# Patient Record
Sex: MALE | Race: WHITE
[De-identification: names, ages, dates, MRNs, and addresses within clinical notes are randomized per-mention and may not be internally consistent; named-entity substitution may affect disease eponyms.]

---

## 2019-06-18 ENCOUNTER — HOSPITAL ENCOUNTER (INPATIENT)
Dept: HOSPITAL 54 - ER | Age: 24
LOS: 3 days | Discharge: HOME | DRG: 640 | End: 2019-06-21
Attending: INTERNAL MEDICINE | Admitting: INTERNAL MEDICINE
Payer: COMMERCIAL

## 2019-06-18 VITALS — SYSTOLIC BLOOD PRESSURE: 134 MMHG | DIASTOLIC BLOOD PRESSURE: 74 MMHG

## 2019-06-18 VITALS — DIASTOLIC BLOOD PRESSURE: 69 MMHG | SYSTOLIC BLOOD PRESSURE: 104 MMHG

## 2019-06-18 VITALS — DIASTOLIC BLOOD PRESSURE: 74 MMHG | SYSTOLIC BLOOD PRESSURE: 134 MMHG

## 2019-06-18 VITALS — DIASTOLIC BLOOD PRESSURE: 63 MMHG | SYSTOLIC BLOOD PRESSURE: 110 MMHG

## 2019-06-18 VITALS — HEIGHT: 71 IN | BODY MASS INDEX: 27.3 KG/M2 | WEIGHT: 195.01 LBS

## 2019-06-18 DIAGNOSIS — E86.0: Primary | ICD-10-CM

## 2019-06-18 DIAGNOSIS — K44.9: ICD-10-CM

## 2019-06-18 DIAGNOSIS — K22.6: ICD-10-CM

## 2019-06-18 DIAGNOSIS — D72.829: ICD-10-CM

## 2019-06-18 DIAGNOSIS — A08.4: ICD-10-CM

## 2019-06-18 DIAGNOSIS — K29.71: ICD-10-CM

## 2019-06-18 DIAGNOSIS — R79.89: ICD-10-CM

## 2019-06-18 DIAGNOSIS — K29.81: ICD-10-CM

## 2019-06-18 DIAGNOSIS — E80.6: ICD-10-CM

## 2019-06-18 DIAGNOSIS — Z88.0: ICD-10-CM

## 2019-06-18 LAB
ALBUMIN SERPL BCP-MCNC: 4 G/DL (ref 3.4–5)
ALP SERPL-CCNC: 84 U/L (ref 46–116)
ALT SERPL W P-5'-P-CCNC: 38 U/L (ref 12–78)
AST SERPL W P-5'-P-CCNC: 19 U/L (ref 15–37)
BASOPHILS # BLD AUTO: 0.1 /CMM (ref 0–0.2)
BASOPHILS # BLD AUTO: 0.1 /CMM (ref 0–0.2)
BASOPHILS NFR BLD AUTO: 0.5 % (ref 0–2)
BASOPHILS NFR BLD AUTO: 0.6 % (ref 0–2)
BILIRUB DIRECT SERPL-MCNC: 0.2 MG/DL (ref 0–0.2)
BILIRUB SERPL-MCNC: 1.4 MG/DL (ref 0.2–1)
BUN SERPL-MCNC: 28 MG/DL (ref 7–18)
CALCIUM SERPL-MCNC: 8.6 MG/DL (ref 8.5–10.1)
CHLORIDE SERPL-SCNC: 104 MMOL/L (ref 98–107)
CO2 SERPL-SCNC: 28 MMOL/L (ref 21–32)
CREAT SERPL-MCNC: 0.9 MG/DL (ref 0.6–1.3)
EOSINOPHIL NFR BLD AUTO: 1.6 % (ref 0–6)
EOSINOPHIL NFR BLD AUTO: 4.1 % (ref 0–6)
GLUCOSE SERPL-MCNC: 130 MG/DL (ref 74–106)
HCT VFR BLD AUTO: 43 % (ref 39–51)
HCT VFR BLD AUTO: 47 % (ref 39–51)
HGB BLD-MCNC: 14.6 G/DL (ref 13.5–17.5)
HGB BLD-MCNC: 15.8 G/DL (ref 13.5–17.5)
LIPASE SERPL-CCNC: 119 U/L (ref 73–393)
LYMPHOCYTES NFR BLD AUTO: 1.7 /CMM (ref 0.8–4.8)
LYMPHOCYTES NFR BLD AUTO: 14.9 % (ref 20–44)
LYMPHOCYTES NFR BLD AUTO: 19.3 % (ref 20–44)
LYMPHOCYTES NFR BLD AUTO: 2.6 /CMM (ref 0.8–4.8)
MCHC RBC AUTO-ENTMCNC: 34 G/DL (ref 31–36)
MCHC RBC AUTO-ENTMCNC: 34 G/DL (ref 31–36)
MCV RBC AUTO: 88 FL (ref 80–96)
MCV RBC AUTO: 89 FL (ref 80–96)
MONOCYTES NFR BLD AUTO: 0.7 /CMM (ref 0.1–1.3)
MONOCYTES NFR BLD AUTO: 1 /CMM (ref 0.1–1.3)
MONOCYTES NFR BLD AUTO: 5.7 % (ref 2–12)
MONOCYTES NFR BLD AUTO: 7.6 % (ref 2–12)
NEUTROPHILS # BLD AUTO: 9 /CMM (ref 1.8–8.9)
NEUTROPHILS # BLD AUTO: 9.3 /CMM (ref 1.8–8.9)
NEUTROPHILS NFR BLD AUTO: 68.4 % (ref 43–81)
NEUTROPHILS NFR BLD AUTO: 77.3 % (ref 43–81)
PLATELET # BLD AUTO: 252 /CMM (ref 150–450)
PLATELET # BLD AUTO: 287 /CMM (ref 150–450)
POTASSIUM SERPL-SCNC: 4.2 MMOL/L (ref 3.5–5.1)
PROT SERPL-MCNC: 7.6 G/DL (ref 6.4–8.2)
RBC # BLD AUTO: 4.85 MIL/UL (ref 4.5–6)
RBC # BLD AUTO: 5.25 MIL/UL (ref 4.5–6)
SODIUM SERPL-SCNC: 140 MMOL/L (ref 136–145)
WBC NRBC COR # BLD AUTO: 11.7 K/UL (ref 4.3–11)
WBC NRBC COR # BLD AUTO: 13.6 K/UL (ref 4.3–11)

## 2019-06-18 PROCEDURE — C9113 INJ PANTOPRAZOLE SODIUM, VIA: HCPCS

## 2019-06-18 PROCEDURE — G0378 HOSPITAL OBSERVATION PER HR: HCPCS

## 2019-06-18 RX ADMIN — DEXTROSE AND SODIUM CHLORIDE PRN MLS/HR: 5; 450 INJECTION, SOLUTION INTRAVENOUS at 12:54

## 2019-06-18 RX ADMIN — DEXTROSE AND SODIUM CHLORIDE PRN MLS/HR: 5; 450 INJECTION, SOLUTION INTRAVENOUS at 21:53

## 2019-06-18 RX ADMIN — SUCRALFATE SCH G: 1 SUSPENSION ORAL at 17:49

## 2019-06-18 RX ADMIN — ESOMEPRAZOLE SODIUM SCH MG: 40 INJECTION, POWDER, LYOPHILIZED, FOR SOLUTION INTRAVENOUS at 13:47

## 2019-06-18 RX ADMIN — ESOMEPRAZOLE SODIUM SCH MG: 40 INJECTION, POWDER, LYOPHILIZED, FOR SOLUTION INTRAVENOUS at 16:00

## 2019-06-18 RX ADMIN — SUCRALFATE SCH G: 1 SUSPENSION ORAL at 23:04

## 2019-06-18 NOTE — NUR
MS/RN OPENING NOTES

RECEIVED PATIENT IN BES, AWAKE, ALERT X4, NO GRIMACE OR GUARDING, SKIN WARM TO TOUCH, ABLE 
TO VERBALIZE NEEDS, DISCUSSED PLAN OF CARE AND PROCEDURE FOR EGD, CONSENT SIGNED,. SKIN WARM 
TO TOUCH, BED LOCKED, CALL LIGHTS WITHIN REACH. WILL MONITOR. BED LOCKED, CALL LIGHTS WITHIN 
REACH. WILL MONITOR.OFFERED FLUIDS.

## 2019-06-18 NOTE — NUR
ADMISSION NOTE



PT WAS BROUGHT UP VIA GURNEY AT THIS TIME, A/O X4 BREATHING EVEN AND UNLABORED ON RA, NO 
COMPLAINTS OF PAIN OR ANY DISTRESS NOTED, NOTED TO HAVE RIGHT AC GAUGE 18 IV, BELONGINGS 
PRESENT AT BEDSIDE, AMBULATORY, SKIN DRY AND INTACT, CURRENTLY NPO DUE TO VOMITING UP BLOOD. 
SAFETY PRECAUTIONS IN PLACE, CALL LIGHT WITHIN REACH, WILL MONITOR ACCORDINGLY

## 2019-06-18 NOTE — NUR
RN CLOSING NOTE



PT IN BED AT LOWEST AND LOCKED POSITION WITH SIDE RAILS UP X2, A/O X4 BREATHING EVEN AND 
UNLABORED WITH NO PAIN OR DISTRESS, IV IS PATENT AND INTACT, SAFETY PRECAUTIONS IN PLACE, 
CALL LIGHT WITHIN REACH, ALL NEEDS ATTENDED TO, WILL ENDORSE TO NIGHT SHIFT RN FOR DAMI.

## 2019-06-18 NOTE — NUR
VOMITING BLOOD STARTED LAST NIGHT PER PT. PT AAOX4, RR EVEN & UNLABORED. SKIN 
PALE. ALSO C/O ABD PAIN 4/10 & GE WELL. DENIES CP, SOB, DIZZINESS, DIARRHEA AT 
THIS TIME. PT SEEN & EVAL'D BY DR. DIAS. MEDICATED AS ORDERED & WILL CONT 
TO MONITOR.

## 2019-06-19 VITALS — DIASTOLIC BLOOD PRESSURE: 61 MMHG | SYSTOLIC BLOOD PRESSURE: 110 MMHG

## 2019-06-19 VITALS — SYSTOLIC BLOOD PRESSURE: 129 MMHG | DIASTOLIC BLOOD PRESSURE: 57 MMHG

## 2019-06-19 VITALS — DIASTOLIC BLOOD PRESSURE: 75 MMHG | SYSTOLIC BLOOD PRESSURE: 143 MMHG

## 2019-06-19 VITALS — SYSTOLIC BLOOD PRESSURE: 119 MMHG | DIASTOLIC BLOOD PRESSURE: 62 MMHG

## 2019-06-19 LAB
BASOPHILS # BLD AUTO: 0.1 /CMM (ref 0–0.2)
BASOPHILS NFR BLD AUTO: 0.6 % (ref 0–2)
BUN SERPL-MCNC: 19 MG/DL (ref 7–18)
CALCIUM SERPL-MCNC: 7.7 MG/DL (ref 8.5–10.1)
CHLORIDE SERPL-SCNC: 105 MMOL/L (ref 98–107)
CO2 SERPL-SCNC: 26 MMOL/L (ref 21–32)
CREAT SERPL-MCNC: 0.8 MG/DL (ref 0.6–1.3)
EOSINOPHIL NFR BLD AUTO: 4 % (ref 0–6)
GLUCOSE SERPL-MCNC: 122 MG/DL (ref 74–106)
HCT VFR BLD AUTO: 35 % (ref 39–51)
HEMOCCULT STL QL: POSITIVE
HGB BLD-MCNC: 11.6 G/DL (ref 13.5–17.5)
LYMPHOCYTES NFR BLD AUTO: 35.6 % (ref 20–44)
LYMPHOCYTES NFR BLD AUTO: 4 /CMM (ref 0.8–4.8)
MAGNESIUM SERPL-MCNC: 1.9 MG/DL (ref 1.8–2.4)
MCHC RBC AUTO-ENTMCNC: 34 G/DL (ref 31–36)
MCV RBC AUTO: 88 FL (ref 80–96)
MONOCYTES NFR BLD AUTO: 0.8 /CMM (ref 0.1–1.3)
MONOCYTES NFR BLD AUTO: 7.6 % (ref 2–12)
NEUTROPHILS # BLD AUTO: 5.8 /CMM (ref 1.8–8.9)
NEUTROPHILS NFR BLD AUTO: 52.2 % (ref 43–81)
PHOSPHATE SERPL-MCNC: 3.2 MG/DL (ref 2.5–4.9)
PLATELET # BLD AUTO: 242 /CMM (ref 150–450)
POTASSIUM SERPL-SCNC: 3.5 MMOL/L (ref 3.5–5.1)
RBC # BLD AUTO: 3.93 MIL/UL (ref 4.5–6)
SODIUM SERPL-SCNC: 139 MMOL/L (ref 136–145)
WBC NRBC COR # BLD AUTO: 11.1 K/UL (ref 4.3–11)

## 2019-06-19 RX ADMIN — SUCRALFATE SCH G: 1 SUSPENSION ORAL at 07:30

## 2019-06-19 RX ADMIN — SUCRALFATE SCH G: 1 SUSPENSION ORAL at 21:25

## 2019-06-19 RX ADMIN — ESOMEPRAZOLE SODIUM SCH MG: 40 INJECTION, POWDER, LYOPHILIZED, FOR SOLUTION INTRAVENOUS at 09:17

## 2019-06-19 RX ADMIN — ESOMEPRAZOLE SODIUM SCH MG: 40 INJECTION, POWDER, LYOPHILIZED, FOR SOLUTION INTRAVENOUS at 16:50

## 2019-06-19 RX ADMIN — DEXTROSE AND SODIUM CHLORIDE PRN MLS/HR: 5; 450 INJECTION, SOLUTION INTRAVENOUS at 12:14

## 2019-06-19 RX ADMIN — SUCRALFATE SCH G: 1 SUSPENSION ORAL at 12:13

## 2019-06-19 RX ADMIN — SUCRALFATE SCH G: 1 SUSPENSION ORAL at 16:50

## 2019-06-19 NOTE — NUR
RN MS NOTES

PT IN BED, ASLEEP, EASY TO AROUSE, ALERT AND ORIENTED, PER MARCOS ROBERTSON, EGD WILL NOT BE DONE 
TODAY, ORDERED TO RESUME PREVIOUS DIET, PT TOLERATES CURRENT DIET, NO NAUSEA OR VOMITING, IV 
FLUIDS INFUSING WELL, CALL LIGHT WITHIN REACH.

## 2019-06-19 NOTE — NUR
CHANGE OF SHIFT REPORT

Patient is awake sitting up in bed. Stable oxygen saturation on RA. IVF infusing. NPO 
breakfast per report, scheduled for EGD in am, consent was signed. Patient denies pain. 
Maintained safety, will cont to monitor.

## 2019-06-19 NOTE — NUR
RN MS NOTES

PT IN BED, AWAKE, ALERT AND ORIENTED, NO COMPLAINT OF PAIN OR ANY DISCOMFORT, NO COMPLAINT 
OF NAUSEA/VOMITING, NOT IN DISTRESS, IV FLUIDS INFUSING WELL, CALL LIGHT WITHIN REACH, 
COMPLIANT AND COOPERATIVE WITH CARE, NEEDS ATTENDED.

## 2019-06-19 NOTE — NUR
MS/RN CLOSING NOTES

PATIENT ABLE TO SLEEP DURING THE NIGHT, ALERT, ORIENTED. DENIES PAIN, NO GUARDING OR GRIMACE 
OBSERVED, RESPIRATIONS EVEN AND UNLABORED, DISCUSSED PLAN OF CARE AND ON NOTHING PER MOUTH 
SINCE 12 MN. FOR PROCEDURE TODAY. KEPT COMFORTABLE. ABLE TO AMULATE TO BATHROOM, COLLECTED 
OCCULT SKINNY. BED LOCKED, CALL LIGHTS WITHIN REACH, DENIA ENDORSE TO AM RN FOR DAMI.

## 2019-06-19 NOTE — NUR
RN MS NOTES

PT IN BED, AWAKE, ALERT AND ORIENTED, DENIES PAIN, NOT IN DISTRESS, VISITOR AT BEDSIDE, IV 
FLUIDS INFUSING WELL, NO BLACK STOOL SINCE THIS MORNING, NO COMPLAINT OF NAUSEA OR VOMITING, 
CALL LIGHT PLACED WITHIN REACH, ALL NEEDS ATTENDED.

## 2019-06-20 VITALS — DIASTOLIC BLOOD PRESSURE: 66 MMHG | SYSTOLIC BLOOD PRESSURE: 118 MMHG

## 2019-06-20 VITALS — DIASTOLIC BLOOD PRESSURE: 65 MMHG | SYSTOLIC BLOOD PRESSURE: 140 MMHG

## 2019-06-20 VITALS — DIASTOLIC BLOOD PRESSURE: 53 MMHG | SYSTOLIC BLOOD PRESSURE: 118 MMHG

## 2019-06-20 LAB
BASOPHILS # BLD AUTO: 0.1 /CMM (ref 0–0.2)
BASOPHILS NFR BLD AUTO: 0.9 % (ref 0–2)
BUN SERPL-MCNC: 8 MG/DL (ref 7–18)
CALCIUM SERPL-MCNC: 8.2 MG/DL (ref 8.5–10.1)
CHLORIDE SERPL-SCNC: 105 MMOL/L (ref 98–107)
CO2 SERPL-SCNC: 28 MMOL/L (ref 21–32)
CREAT SERPL-MCNC: 0.9 MG/DL (ref 0.6–1.3)
EOSINOPHIL NFR BLD AUTO: 5.1 % (ref 0–6)
GLUCOSE SERPL-MCNC: 110 MG/DL (ref 74–106)
HCT VFR BLD AUTO: 30 % (ref 39–51)
HGB BLD-MCNC: 10.2 G/DL (ref 13.5–17.5)
LYMPHOCYTES NFR BLD AUTO: 3 /CMM (ref 0.8–4.8)
LYMPHOCYTES NFR BLD AUTO: 45.4 % (ref 20–44)
MAGNESIUM SERPL-MCNC: 1.8 MG/DL (ref 1.8–2.4)
MCHC RBC AUTO-ENTMCNC: 34 G/DL (ref 31–36)
MCV RBC AUTO: 88 FL (ref 80–96)
MONOCYTES NFR BLD AUTO: 0.4 /CMM (ref 0.1–1.3)
MONOCYTES NFR BLD AUTO: 5.7 % (ref 2–12)
NEUTROPHILS # BLD AUTO: 2.9 /CMM (ref 1.8–8.9)
NEUTROPHILS NFR BLD AUTO: 42.9 % (ref 43–81)
PHOSPHATE SERPL-MCNC: 2.7 MG/DL (ref 2.5–4.9)
PLATELET # BLD AUTO: 200 /CMM (ref 150–450)
POTASSIUM SERPL-SCNC: 3.6 MMOL/L (ref 3.5–5.1)
RBC # BLD AUTO: 3.39 MIL/UL (ref 4.5–6)
SODIUM SERPL-SCNC: 140 MMOL/L (ref 136–145)
WBC NRBC COR # BLD AUTO: 6.7 K/UL (ref 4.3–11)

## 2019-06-20 PROCEDURE — 0DB48ZX EXCISION OF ESOPHAGOGASTRIC JUNCTION, VIA NATURAL OR ARTIFICIAL OPENING ENDOSCOPIC, DIAGNOSTIC: ICD-10-PCS

## 2019-06-20 PROCEDURE — 0DB68ZX EXCISION OF STOMACH, VIA NATURAL OR ARTIFICIAL OPENING ENDOSCOPIC, DIAGNOSTIC: ICD-10-PCS

## 2019-06-20 RX ADMIN — SUCRALFATE SCH G: 1 SUSPENSION ORAL at 07:30

## 2019-06-20 RX ADMIN — SUCRALFATE SCH G: 1 SUSPENSION ORAL at 12:00

## 2019-06-20 RX ADMIN — ESOMEPRAZOLE SODIUM SCH MG: 40 INJECTION, POWDER, LYOPHILIZED, FOR SOLUTION INTRAVENOUS at 19:02

## 2019-06-20 RX ADMIN — SUCRALFATE SCH G: 1 SUSPENSION ORAL at 21:53

## 2019-06-20 RX ADMIN — DEXTROSE AND SODIUM CHLORIDE PRN MLS/HR: 5; 450 INJECTION, SOLUTION INTRAVENOUS at 02:48

## 2019-06-20 RX ADMIN — DEXTROSE AND SODIUM CHLORIDE PRN MLS/HR: 5; 450 INJECTION, SOLUTION INTRAVENOUS at 18:53

## 2019-06-20 RX ADMIN — SUCRALFATE SCH G: 1 SUSPENSION ORAL at 18:53

## 2019-06-20 RX ADMIN — ESOMEPRAZOLE SODIUM SCH MG: 40 INJECTION, POWDER, LYOPHILIZED, FOR SOLUTION INTRAVENOUS at 09:00

## 2019-06-20 NOTE — NUR
RN INITIAL NOTES:

RECEIVED REPORT FROM SHERRON HILL. PT S/P EGD TODAY 6/20/19, MET WITH PT AT BED SIDE, PT A/O X4 
ON RA RESPIRATION EVEN AND UNLABORED, STATED HE STILL FEELING BLOATED. PER PT HE ALREADY HAD 
MEAL, DENIES ANY N/V. MET WITH FAMILY AT BED SIDE. PT HAS RIGHT AC IV ACCESS PATENT AND 
FLUSHING WELL, INFUSING WITH D5 1/2 NS AT 125ML/HR. NO S/S OF LEAKING OR INFILTRATION NOTED. 
DISCUSSED PLAN OF CARE TO PT. SAFETY PRECAUTIONS FOR FALL INITIATED, CALL LIGHT IN REACH, 
WILL CONTINUE MONITORING PT.

## 2019-06-20 NOTE — NUR
ms rn

received on bed, awake,alert,oriented x4,not in any form of distress, respirations even and 
unlabored,no sob noted, lungs are clear,abdomen soft,positive bowel sounds,denies pain at 
this time.

## 2019-06-20 NOTE — NUR
RN NOTES-ADMIN OF CARAFATE:

VERIFIED WITH PHARMACY SPOKED TO ESTHER, INFORMED PT RECEIVED CARAFATE AT 1853, PER ESTHER 
OKAY TO GIVE IT AT NIGHT, ORDER IS [facility]S

## 2019-06-20 NOTE — NUR
END OF SHIFT REPORT

Patient in bed, stable oxygen saturation on RA. NPO midnight, IVF infusing maintained at 125 
ml/hr. Ambulates independently, denies pain. Had BM this shift, stool black x1 H/H stable, 
stool OB positive. Planned EGD today, patient consented procedure. Maintained safety.

## 2019-06-21 VITALS — SYSTOLIC BLOOD PRESSURE: 124 MMHG | DIASTOLIC BLOOD PRESSURE: 69 MMHG

## 2019-06-21 VITALS — SYSTOLIC BLOOD PRESSURE: 124 MMHG | DIASTOLIC BLOOD PRESSURE: 49 MMHG

## 2019-06-21 LAB
BASOPHILS # BLD AUTO: 0 /CMM (ref 0–0.2)
BASOPHILS NFR BLD AUTO: 0.6 % (ref 0–2)
BUN SERPL-MCNC: 8 MG/DL (ref 7–18)
CALCIUM SERPL-MCNC: 8.3 MG/DL (ref 8.5–10.1)
CHLORIDE SERPL-SCNC: 105 MMOL/L (ref 98–107)
CO2 SERPL-SCNC: 29 MMOL/L (ref 21–32)
CREAT SERPL-MCNC: 0.9 MG/DL (ref 0.6–1.3)
EOSINOPHIL NFR BLD AUTO: 4.2 % (ref 0–6)
GLUCOSE SERPL-MCNC: 111 MG/DL (ref 74–106)
HCT VFR BLD AUTO: 29 % (ref 39–51)
HGB BLD-MCNC: 10 G/DL (ref 13.5–17.5)
LYMPHOCYTES NFR BLD AUTO: 2.9 /CMM (ref 0.8–4.8)
LYMPHOCYTES NFR BLD AUTO: 40.3 % (ref 20–44)
MCHC RBC AUTO-ENTMCNC: 35 G/DL (ref 31–36)
MCV RBC AUTO: 88 FL (ref 80–96)
MONOCYTES NFR BLD AUTO: 0.5 /CMM (ref 0.1–1.3)
MONOCYTES NFR BLD AUTO: 7.1 % (ref 2–12)
NEUTROPHILS # BLD AUTO: 3.5 /CMM (ref 1.8–8.9)
NEUTROPHILS NFR BLD AUTO: 47.8 % (ref 43–81)
PLATELET # BLD AUTO: 203 /CMM (ref 150–450)
POTASSIUM SERPL-SCNC: 3.6 MMOL/L (ref 3.5–5.1)
RBC # BLD AUTO: 3.29 MIL/UL (ref 4.5–6)
SODIUM SERPL-SCNC: 140 MMOL/L (ref 136–145)
WBC NRBC COR # BLD AUTO: 7.3 K/UL (ref 4.3–11)

## 2019-06-21 RX ADMIN — SUCRALFATE SCH G: 1 SUSPENSION ORAL at 17:10

## 2019-06-21 RX ADMIN — DEXTROSE AND SODIUM CHLORIDE PRN MLS/HR: 5; 450 INJECTION, SOLUTION INTRAVENOUS at 02:32

## 2019-06-21 RX ADMIN — ESOMEPRAZOLE SODIUM SCH MG: 40 INJECTION, POWDER, LYOPHILIZED, FOR SOLUTION INTRAVENOUS at 08:31

## 2019-06-21 RX ADMIN — SUCRALFATE SCH G: 1 SUSPENSION ORAL at 07:49

## 2019-06-21 RX ADMIN — DEXTROSE AND SODIUM CHLORIDE PRN MLS/HR: 5; 450 INJECTION, SOLUTION INTRAVENOUS at 10:25

## 2019-06-21 RX ADMIN — SUCRALFATE SCH G: 1 SUSPENSION ORAL at 11:59

## 2019-06-21 NOTE — NUR
MS RN NOTES

PATIENT DISCHARGED HOME WITH STABLE VITAL SIGNS. NO ACUTE DISTRESS NOTED, BREATHING 
UNLABORED. NO SOB NOTED. DISCHARGE INSTRUCTIONS GIVEN TO PATIENT INCLUDING FOLLOW UP WITH  
DOCTORS AND NEW PRESCRIPTIONS, VERBALIZED UNDERSTANDING.  ALL BELONGINGS ACCOUNTED FOR. IV 
ACCESS REMOVED, NO BLEEDING, NO REDNESS NOTED. SKIN IS INTACT. PATIENT ALERT ORIENTED X 3, 
AMBULATORY WITH STEADY GAIT.  ASSISTED TO THE LOBBY,  ASSISTED TO A PRIVATE CAR WITH MOTHER 
IN STABLE CONDITION.

## 2019-06-21 NOTE — NUR
MS RN NOTES

PATIENT IN BED , ALERT ORIENTED X 3. NO ACUTE DISTRESS NOTED. BREATHING UNLABORED. IV ACCESS 
PATENT AND INTACT, NO REDNESS OR SWELLING NOTED. SAFETY MEASURES IN PLACE. CALL LIGHT WITHIN 
REACH. WILL CONTINUE TO MONITOR ACCORDINGLY.

## 2019-06-21 NOTE — NUR
rn closing notes:

pt in bed, awake, denies any pain or discomfort at this time. denies any sob, mother at bed 
side. iv access remains patent and flushing well, infusing with d5 1/2 ns at 125ml/hr. no 
s/s of iv infiltration noted at site. vs remains stable, no episode of n/v noted, no s/s of 
bleeding noted throughout the shift. safety precautions for fall remains engaged, call light 
in reach, will endorse to day rn for continuity of care.